# Patient Record
Sex: FEMALE | Race: WHITE | Employment: FULL TIME | URBAN - METROPOLITAN AREA
[De-identification: names, ages, dates, MRNs, and addresses within clinical notes are randomized per-mention and may not be internally consistent; named-entity substitution may affect disease eponyms.]

---

## 2022-09-07 ENCOUNTER — OFFICE VISIT (OUTPATIENT)
Dept: ORTHOPEDIC SURGERY | Age: 47
End: 2022-09-07
Payer: COMMERCIAL

## 2022-09-07 VITALS — BODY MASS INDEX: 43.19 KG/M2 | HEIGHT: 64 IN | WEIGHT: 253 LBS

## 2022-09-07 DIAGNOSIS — S99.921A INJURY OF RIGHT FOOT, INITIAL ENCOUNTER: ICD-10-CM

## 2022-09-07 DIAGNOSIS — S99.911A INJURY OF RIGHT ANKLE, INITIAL ENCOUNTER: Primary | ICD-10-CM

## 2022-09-07 PROCEDURE — L4360 PNEUMAT WALKING BOOT PRE CST: HCPCS | Performed by: ORTHOPAEDIC SURGERY

## 2022-09-07 PROCEDURE — 99204 OFFICE O/P NEW MOD 45 MIN: CPT | Performed by: ORTHOPAEDIC SURGERY

## 2022-09-07 PROCEDURE — A9999 DME SUPPLY OR ACCESSORY, NOS: HCPCS | Performed by: ORTHOPAEDIC SURGERY

## 2022-09-07 RX ORDER — ESOMEPRAZOLE MAGNESIUM 40 MG/1
CAPSULE, DELAYED RELEASE ORAL
COMMUNITY
Start: 2012-12-19

## 2022-09-07 RX ORDER — NYSTATIN 100000 [USP'U]/G
POWDER TOPICAL
COMMUNITY
Start: 2020-10-21

## 2022-09-07 RX ORDER — AMLODIPINE BESYLATE 5 MG/1
TABLET ORAL
COMMUNITY
Start: 2022-09-02

## 2022-09-07 RX ORDER — OMEPRAZOLE 40 MG/1
CAPSULE, DELAYED RELEASE ORAL
COMMUNITY
Start: 2022-06-22

## 2022-09-07 RX ORDER — TRAZODONE HYDROCHLORIDE 100 MG/1
TABLET ORAL
COMMUNITY

## 2022-09-07 RX ORDER — MAGNESIUM OXIDE 400 MG/1
TABLET ORAL
COMMUNITY
Start: 2021-07-26

## 2022-09-07 RX ORDER — ONDANSETRON 4 MG/1
TABLET, ORALLY DISINTEGRATING ORAL
COMMUNITY

## 2022-09-07 RX ORDER — TOPIRAMATE 100 MG/1
TABLET, FILM COATED ORAL
COMMUNITY
Start: 2022-08-15

## 2022-09-07 RX ORDER — LORATADINE 10 MG/1
TABLET ORAL
COMMUNITY
Start: 2022-09-02

## 2022-09-07 RX ORDER — IBUPROFEN 800 MG/1
800 TABLET ORAL
COMMUNITY

## 2022-09-07 RX ORDER — ONDANSETRON HYDROCHLORIDE 8 MG/1
TABLET, FILM COATED ORAL
COMMUNITY

## 2022-09-07 RX ORDER — MEDROXYPROGESTERONE ACETATE 10 MG/1
TABLET ORAL
COMMUNITY

## 2022-09-07 RX ORDER — DIAZEPAM 5 MG/1
TABLET ORAL
COMMUNITY
Start: 2022-08-08

## 2022-09-07 RX ORDER — CHLORPHENIRAMINE MALEATE 4 MG/1
TABLET ORAL
COMMUNITY
Start: 2021-08-10

## 2022-09-07 RX ORDER — AMOXICILLIN AND CLAVULANATE POTASSIUM 875; 125 MG/1; MG/1
1 TABLET, FILM COATED ORAL 2 TIMES DAILY
Qty: 28 TABLET | Refills: 0 | Status: SHIPPED | OUTPATIENT
Start: 2022-09-07 | End: 2022-09-21

## 2022-09-07 RX ORDER — ROPINIROLE 1 MG/1
TABLET, FILM COATED ORAL
COMMUNITY
Start: 2022-07-30

## 2022-09-07 RX ORDER — HYDROXYZINE 50 MG/1
TABLET, FILM COATED ORAL
COMMUNITY
Start: 2021-05-26

## 2022-09-07 RX ORDER — ERTUGLIFLOZIN 15 MG/1
TABLET, FILM COATED ORAL
COMMUNITY
Start: 2021-07-26

## 2022-09-07 RX ORDER — FLUOXETINE HYDROCHLORIDE 90 MG/1
CAPSULE, DELAYED RELEASE PELLETS ORAL
COMMUNITY
Start: 2012-12-19

## 2022-09-07 RX ORDER — CETIRIZINE HYDROCHLORIDE 10 MG/1
TABLET ORAL
COMMUNITY
Start: 2022-06-19

## 2022-09-07 RX ORDER — DEXAMETHASONE 6 MG/1
TABLET ORAL
COMMUNITY

## 2022-09-07 RX ORDER — QUETIAPINE FUMARATE 200 MG/1
TABLET, FILM COATED ORAL
COMMUNITY
Start: 2022-08-08

## 2022-09-07 RX ORDER — LEVOTHYROXINE SODIUM 0.2 MG/1
TABLET ORAL
COMMUNITY
Start: 2022-09-02

## 2022-09-07 RX ORDER — INSULIN ASPART 100 [IU]/ML
INJECTION, SOLUTION INTRAVENOUS; SUBCUTANEOUS
COMMUNITY

## 2022-09-07 RX ORDER — CYCLOBENZAPRINE HCL 5 MG
TABLET ORAL
COMMUNITY
Start: 2022-08-29

## 2022-09-07 RX ORDER — FLUTICASONE PROPIONATE 50 MCG
SPRAY, SUSPENSION (ML) NASAL
COMMUNITY
Start: 2022-08-08

## 2022-09-07 RX ORDER — PSEUDOEPHEDRINE HCL 30 MG
100 TABLET ORAL 2 TIMES DAILY
COMMUNITY
Start: 2021-02-19

## 2022-09-07 RX ORDER — OXYCODONE HYDROCHLORIDE 5 MG/1
TABLET ORAL
COMMUNITY

## 2022-09-07 RX ORDER — LEVOFLOXACIN 750 MG/1
TABLET ORAL
COMMUNITY
Start: 2022-06-22 | End: 2022-09-07 | Stop reason: ALTCHOICE

## 2022-09-07 RX ORDER — MONTELUKAST SODIUM 10 MG/1
TABLET ORAL
COMMUNITY
Start: 2022-08-10

## 2022-09-07 RX ORDER — MIRTAZAPINE 15 MG/1
TABLET, FILM COATED ORAL
COMMUNITY
Start: 2022-09-02

## 2022-09-07 RX ORDER — INSULIN LISPRO 100 U/ML
INJECTION, SOLUTION INTRAVENOUS; SUBCUTANEOUS
COMMUNITY
Start: 2022-08-25

## 2022-09-07 RX ORDER — AMOXICILLIN AND CLAVULANATE POTASSIUM 875; 125 MG/1; MG/1
TABLET, FILM COATED ORAL
COMMUNITY
Start: 2022-07-28 | End: 2022-09-07 | Stop reason: ALTCHOICE

## 2022-09-07 RX ORDER — BUTALBITAL, ACETAMINOPHEN AND CAFFEINE 50; 325; 40 MG/1; MG/1; MG/1
TABLET ORAL
COMMUNITY
Start: 2019-01-02

## 2022-09-07 RX ORDER — AZELASTINE HYDROCHLORIDE 137 UG/1
SPRAY, METERED NASAL
COMMUNITY
Start: 2022-06-22

## 2022-09-07 RX ORDER — CELECOXIB 200 MG/1
CAPSULE ORAL
COMMUNITY

## 2022-09-07 RX ORDER — DULOXETIN HYDROCHLORIDE 60 MG/1
CAPSULE, DELAYED RELEASE ORAL
COMMUNITY
Start: 2022-09-02

## 2022-09-07 RX ORDER — GABAPENTIN 800 MG/1
TABLET ORAL
COMMUNITY
Start: 2022-08-08

## 2022-09-07 RX ORDER — ALBUTEROL SULFATE 90 UG/1
AEROSOL, METERED RESPIRATORY (INHALATION)
COMMUNITY

## 2022-09-07 RX ORDER — HYDROXYZINE PAMOATE 100 MG/1
CAPSULE ORAL
COMMUNITY

## 2022-09-07 RX ORDER — METRONIDAZOLE 500 MG/1
TABLET ORAL
COMMUNITY
Start: 2022-08-29 | End: 2022-09-07 | Stop reason: ALTCHOICE

## 2022-09-07 RX ORDER — ATORVASTATIN CALCIUM 40 MG/1
TABLET, FILM COATED ORAL
COMMUNITY
Start: 2022-07-30

## 2022-09-07 RX ORDER — AZITHROMYCIN 250 MG/1
TABLET, FILM COATED ORAL
COMMUNITY
Start: 2022-07-19 | End: 2022-09-07 | Stop reason: ALTCHOICE

## 2022-09-07 RX ORDER — PROMETHAZINE HYDROCHLORIDE 25 MG/1
TABLET ORAL PRN
COMMUNITY

## 2022-09-07 RX ORDER — OMEGA-3S/DHA/EPA/FISH OIL/D3 300MG-1000
400 CAPSULE ORAL DAILY
COMMUNITY

## 2022-09-07 RX ORDER — MELOXICAM 15 MG/1
TABLET ORAL
COMMUNITY
Start: 2022-09-02

## 2022-09-07 RX ORDER — TRAMADOL HYDROCHLORIDE 50 MG/1
TABLET ORAL
COMMUNITY
Start: 2022-08-29

## 2022-09-07 NOTE — LETTER
DME Patient Authorization Form    Name: Louann Viera  : 1975  MRN: [id number]   Age: 55 y.o. Gender: female  Delivery Address: [facility]     Diagnosis:     ICD-10-CM    1. Injury of right ankle, initial encounter  S99.928V XR ANKLE RIGHT (MIN 3 VIEWS)     XR FOOT RIGHT (2 VIEWS)      2. Injury of right foot, initial encounter  S99.380W            Requested DME:  Bunion Splint - -99 ($15.00) X 1 - right  Walker Boot -  ($290.00) X 1 - right        Clinical Notes:     **Indicates non-covered items by insurance. Payment expected on date of service. Electronically signed by  Provider: Dennis Friend MD  _______________________________ Date: 2022                             [facility] [place] Tax ID # [id number]        Durable Medical Equipment and/or Orthotics Patient Consent     I understand that my physician has prescribed this medical supply as part of my treatment plan as a matter of Medical Necessity.  I understand that I have a choice in where I receive my prescribed orthopedic supplies and/or services.  I authorize [facility] to furnish this service/product and to provide my insurance carrier with any information requested in order to process for payment.  I instruct my insurance carrier to pay [facility] these services/products.  I understand that my insurance carrier may deny payment for this supply because it is a non-covered item, deemed not medically necessary or considered experimental.   I understand that any cost not covered by my insurance carrier will be solely my financial responsibility.  I have received the Supplier Standards and have reviewed them.    I have received the prescribed item and have been fully instructed on the proper use of the above services/products.    ______ (Patient Initials) I understand that all DME items are non-returnable after being dispensed. Items still in sealed packaging may be returned up to 14 days after purchasing. [address] will replace items that are defective.    ______ (Patient Initials) I understand that Ean Marcano will not file a claim with my insurance carrier for this service/product and I am waiving my right to file a claim on my own for this service/product with my insurance company as this item is NON-COVERED (Denoted by the **) by my Insurance company/policy. ______ (Patient Initials) I understand that I am responsible to bring my equipment to the hospital for any surgery. ______________________________________________  ________________________  Patient / [facility]            Thank you for considering [address] Your physician has prescribed specific medical equipment or devices for your home use. The following describes your rights and responsibilities as our customer. Right to Choose Providers: You have a choice regarding which company supplies your home medical equipment and devices, and to consult your physician in this decision. You may choose a medical supply store, a home medical equipment provider, or a specialist such as [facility]. [facility] will coordinate with your physician to provide the medical equipment or devices prescribed for your home use. Right to Service:  You have the right to considerate, respectful and nondiscriminatory care. You have the right to receive accurate and easily understood information about your health care. If you speak a foreign language, or don't understand the discussions, assistance will be provided to allow you to make informed health care decisions.   You have the right to know your treatment options and to participate in decisions about your care, including the right to accept or refuse treatment. You have the right to expect a reasonable response to your requests for treatment or service. You have the right to talk in confidence with health care providers and to have your health care information protected. You have the right to receive an explanation of your bill. You have the right to complain about the service or product you receive. Patient Responsibilities:  Please provide complete and accurate information about your health insurance benefits and make arrangements for the timely payment of your bill. [facility] will, if possible, assume responsibility for billing your insurance (Medicare, Medicaid and commercial) for the prescribed equipment or devices. If your policy does not cover the prescribed product, or only covers a portion of the bill, you are responsible for any remaining balance. Return and Exchange Policy:  [facility] will honor published  Warranties for products. [facility] will accept returns or exchanges within 14 days from the date of receipt, providin) the product must be in new condition; 2) receipt as required; and 3) used disposable and hygiene products may only be returned due to a defective product. Note: Refunds will be issued in a timely manner, please allow 4-6 weeks for processing. Complaint Procedures and DME Consumer Protection Resources:  [facility] values you as a customer, and is committed to resolving patient concerns. This commitment includes understanding and documenting your concerns, conducting a review of internal procedures, and providing you with an explanation and resolution to your concerns. Should you have any questions about our services or billing process, please contact our office at (practice phone number).   If we are unable to resolve the concern, you have the right to direct comments to the office of Consumer Protection, in the [address] or the [employer] office, without fear of repercussion. DMEPOS SUPPLIER STANDARDS    A supplier must be in compliance with all applicable Federal and [facility] and regulatory requirements. A supplier must provide complete and accurate information on the DMEPOS supplier application. Any changes to this information must be reported to the [facility] [employer] within 30 days. An authorized individual (one whose signature is binding) must sign the application for billing privileges. A supplier must fill orders from its own inventory, or must contract with other companies for the purchase of items necessary to fill the order. A supplier may not contract with any entity that is currently excluded from the Medicare program, any [facility] program, or from any other Federal procurement or Nonprocurement programs. A supplier must advise beneficiaries that they may rent or purchase inexpensive or routinely purchased durable medical equipment, and of the purchase option for capped rental equipment. A supplier must notify beneficiaries of warranty coverage and honor all warranties under applicable State Law, and repair or replace free of charge Medicare covered items that are under warranty. A supplier must maintain a physical facility on an appropriate site. A supplier must permit CMS, or its agents to conduct on-site inspections to ascertain the supplier's compliance with these standards. The supplier location must be accessible to beneficiaries during reasonable business hours, and must maintain a visible sign and posted hours of operation. A supplier must maintain a primary business telephone listed under the name of the business in a [employer] or a toll free number available through directory assistance. The exclusive use of a beeper, answering machine or cell phone is prohibited.   A supplier must have comprehensive liability insurance in the amount of at least $300,000 that covers both the supplier's place of business and all customers and employees of the supplier. If the supplier manufactures its own items, this insurance must also cover product liability and completed operations. A supplier must agree not to initiate telephone contact with beneficiaries, with a few exceptions allowed. This standard prohibits suppliers from calling beneficiaries in order to solicit new business. A supplier is responsible for delivery and must instruct beneficiaries on use of Medicare covered items, and maintain proof of delivery. A supplier must answer questions, and respond to complaints of the beneficiaries, and maintain documentation of such contacts. A supplier must maintain and replace at no charge or repair directly, or through a service contract with another company, Medicare covered items it has rented to beneficiaries. A supplier must accept returns of substandard (less than full quality for the particular item) or unsuitable items (inappropriate for the beneficiary at the time it was fitted and rented or sold) from beneficiaries. A supplier must disclose these supplier standards to each beneficiary to whom it supplies a Medicare-covered item. A supplier must disclose to the government any person having ownership, financial, or control interest in the supplier. A supplier must not convey or reassign a supplier number; i.e., the supplier may not sell or allow another entity to use its Medicare billing number. A supplier must have a complaint resolution protocol established to address beneficiary complaints that relate to these standards. A record of these complaints must be maintained at the physical facility. Complaint records must include: the name, address, telephone number and health insurance claim number of the beneficiary, a summary of the complaint, and any action taken to resolve it. A supplier must agree to furnish CMS any information required by the Medicare statute and implementing regulations.   A supplier of DMEPOS and other items and services must be accredited by a CMS-approved accreditation organization in order to receive and retain a supplier billing number. The accreditation must indicate the specific products and services, for which the supplier is accredited in order for the supplier to receive payment for those specific products and services. A DMEPOS supplier must notify their accreditation organization when a new DMEPOS location is opened. The accreditation organization may accredit the new supplier location for three months after it is operational without requiring a new site visit. All DMEPOS supplier locations, whether owned or subcontracted, must meet the [employer] and be separately accredited in order to bill Medicare. An accredited supplier may be denied enrollment or their enrollment may be revoked, if CMS determines that they are not in compliance with the DMEPOS quality standards. A DMEPOS supplier must disclose upon enrollment all products and services, including the addition of new product lines for which they are seeking accreditation. If a new product line is added after enrollment, the DMEPOS supplier will be responsible for notifying the accrediting body of the new product so that the DMEPOS supplier can be re-surveyed and accredited for these new products. Must meet the surety bond requirements specified in 42 C. F.R. 424.57(c). Implementation date- May 4, 2009. A supplier must obtain oxygen from a state-licensed oxygen supplier. A supplier must maintain ordering and referring documentation consistent with provisions found in 42 C. F.R. 424.516(f). DMEPOS suppliers are prohibited from sharing a practice location with certain other Medicare providers and suppliers. DMEPOS suppliers must remain open to the public for a minimum of 30 hours per week with certain exceptions.

## 2022-09-07 NOTE — LETTER
[address] [address]  Phone: [phone]  Fax: [phone]    Kianna Marshall MD        September 7, 2022     Patient: Wil Curtis   YOB: 1975   Date of Visit: 9/7/2022       To Whom It May Concern: It is my medical opinion that Wil Curtis:  Work status:   Out of work until 09/12/2022. Beginning 09/12/2022, she may return to sitting work. If no sitting work available, out of work for 3 weeks    If you have any questions or concerns, please don't hesitate to call.     Sincerely,        Kianna Marshall MD

## 2022-09-07 NOTE — LETTER
[address] [address]  Phone: [phone]  Fax: [phone]    Rosalynd Lanes, MD        September 7, 2022     Patient: Jhoana Faye   YOB: 1975   Date of Visit: 9/7/2022       To Whom It May Concern: It is my medical opinion that [employer] of work until 09/12/2022: Beginning 09/12/2022, she may return to sitting work. If no sitting longer available, out of work for 3 weeks     If you have any questions or concerns, please don't hesitate to call.     Sincerely,        Rosalynd Lanes, MD

## 2022-09-07 NOTE — PROGRESS NOTES
Name: Jennie Dates  YOB: 1975  Gender: female  MRN: [id number]    CC: Right ankle injury: Right foot injury    HPI:   08/28/2022: She reports falling at home injuring her ankle and foot  08/29/2022: [facility] evaluation  09/02/2022: [facility] evaluation: Postop shoe and crutches  09/07/2022: She presents to assess her foot     ROS/Meds/PSH/PMH/FH/SH: reviewed today    Tobacco:  reports that she has been smoking cigarettes. She has never used smokeless tobacco.   Care everywhere: Treatment for low back pain, L5 radiculopathy: Listed diagnoses of depression, borderline personality disorder, general anxiety disorder treated with Seroquel Cymbalta and Remeron    Physical Examination:  Patient appears to be alert and oriented with acceptable appearance.   No obvious distress or SOB  CV: appears to have acceptable vascular color and capillary refill  Neuro: appears to have mostly intact light touch sensation   Skin: Right ankle to toe bruising; mild dorsal foot redness  MS: No standing or gait exam  Right = medial and lateral ankle pain  Right = hindfoot and midfoot pain  Right = 1-5 forefoot to toe pain    XR: Right side: Standing AP lateral mortise ankle plus AP oblique foot taken today with os trigonum; anterior and posterior enthesopathy; 2-3 tarsometatarsal arthritis; narrowing calcaneonavicular region; possible dorsal talar avulsion fracture; great toe intra-articular IP proximal phalanx fracture; tibial avulsion 2-5 base proximal phalanx fracture; suspected fifth metatarsal tuberosity fracture; no gross ankle pathology appreciated except soft tissue swelling  XR Impression:  As above      Reviewed Test/Records/Documents:  07/19/2022: Pain management visit reflects low back pain; leg pain with diagnosis of L5 radiculopathy treated with lumbar selective nerve root block/transforaminal epidural steroid injection  09/02/2022: [facility] x-rays right foot radiologic impression: Acute 2-5 proximal phalanx fractures:

## 2022-09-08 NOTE — PROGRESS NOTES
The patient was prescribed a walker boot for the patient's right foot. The patient wears a size 10 shoe and I fitted them with a M size boot. The patient was fitted and instructed on the use of prescribed walker boot. I explained how to fit themselves and that the plastic flexible piece should always be on the front of the boot and secured by the Velcro straps on top. The air bladder in the boot was adjusted according to proper fit and comfort. The patient walked a short distance and acknowledged satisfaction with current fit. I also explained that they need a heel lift or a higher heeled shoe for the uninvolved LE to help normalize gait and avoid excessive low back stress/strain due to leg length inequality created from walker boot. The patient was also prescribed and fitted with a bunion splint for the right foot to wear in the boot. Patient read and signed documenting they understand and agree to [facility]'s current DME return policy.

## 2022-09-12 ENCOUNTER — TELEPHONE (OUTPATIENT)
Dept: ORTHOPEDIC SURGERY | Age: 47
End: 2022-09-12

## 2022-09-12 RX ORDER — AMOXICILLIN 500 MG/1
500 CAPSULE ORAL 3 TIMES DAILY
Qty: 30 CAPSULE | Refills: 1 | Status: SHIPPED | OUTPATIENT
Start: 2022-09-12 | End: 2022-09-26

## 2022-09-12 NOTE — TELEPHONE ENCOUNTER
She was seen by Dr. Dior Redman and her pain hasn't gotten any better. She was supposed to return to work today but they didn't schedule her until tomorrow. She also got very sick taking the Augmentin and is wondering if there is something else she can take.

## 2022-09-16 ENCOUNTER — OFFICE VISIT (OUTPATIENT)
Dept: ORTHOPEDIC SURGERY | Age: 47
End: 2022-09-16
Payer: COMMERCIAL

## 2022-09-16 DIAGNOSIS — M79.2 NEURALGIA OF RIGHT FOOT: ICD-10-CM

## 2022-09-16 DIAGNOSIS — S99.921D INJURY OF RIGHT FOOT, SUBSEQUENT ENCOUNTER: ICD-10-CM

## 2022-09-16 DIAGNOSIS — S99.911D INJURY OF RIGHT ANKLE, SUBSEQUENT ENCOUNTER: Primary | ICD-10-CM

## 2022-09-16 PROCEDURE — 99213 OFFICE O/P EST LOW 20 MIN: CPT | Performed by: ORTHOPAEDIC SURGERY

## 2022-09-16 RX ORDER — LIDOCAINE 5% 5 G/100G
CREAM TOPICAL
Qty: 45 G | Refills: 2 | Status: SHIPPED | OUTPATIENT
Start: 2022-09-16

## 2022-09-16 NOTE — LETTER
[address] [address]  Phone: [phone]  Fax: [phone]    Araseli Stoner MD        September 16, 2022     Patient: Dipak Rose   YOB: 1975   Date of Visit: 9/16/2022       To Whom It May Concern: It is my medical opinion that Dipak Rose:  Work Status: Out of work for 3 weeks. If you have any questions or concerns, please don't hesitate to call.      Sincerely,        Araseli Stoner MD

## 2022-09-16 NOTE — PROGRESS NOTES
Name: Mitzi Gonzalez  YOB: 1975  Gender: female  MRN: [id number]    09/07/2022: Initial visit with me for[de-identified] Right ankle injury: Right foot injury  09/12/2022: She called stating Augmentin was bothering her stomach and was switched to plain amoxicillin  09/16/2022: She presents with pain and sensitivity in the plantar lateral foot to medial foot with some pain radiating up above her ankle. She relates pain to trying to return to work     HPI:   08/28/2022: She reports falling at home injuring her ankle and foot  08/29/2022: [facility] evaluation  09/02/2022: [facility] evaluation: Postop shoe and crutches  09/07/2022: She presented to assess her foot     ROS/Meds/PSH/PMH/FH/SH: reviewed today    Tobacco:  reports that she has been smoking cigarettes. She has never used smokeless tobacco.   Care everywhere: Treatment for low back pain, L5 radiculopathy: Listed diagnoses of depression, borderline personality disorder, general anxiety disorder treated with Seroquel Cymbalta and Remeron    Physical Examination:  Patient appears to be alert and oriented with acceptable appearance.   No obvious distress or SOB  CV: appears to have acceptable vascular color and capillary refill  Neuro: appears to have mostly intact light touch sensation   Skin: Right = much less ankle to foot swelling; resolved redness; no webspace ulcer; no lesions  MS: Standing: Plantigrade: Gait postop shoe limited  Right = majority of the pain is sensitivity in the lateral to plantar lateral forefoot extending to the medial foot  Right = medial and lateral ankle pain  Right = hindfoot and midfoot pain  Right = 1-5 forefoot to toe pain    XR: Right side: Standing AP lateral mortise ankle plus AP oblique foot taken today with os trigonum; anterior and posterior enthesopathy; 2-3 tarsometatarsal arthritis; narrowing calcaneonavicular region; possible dorsal talar avulsion fracture; great toe intra-articular IP proximal phalanx fracture; tibial avulsion 2-5 base proximal phalanx fractures; no definite fifth metatarsal tuberosity fracture; no gross ankle pathology appreciated except soft tissue swelling  XR Impression:  As above      Reviewed Test/Records/Documents:  07/19/2022: Pain management visit reflected low back pain; leg pain with diagnosis of L5 radiculopathy treated with lumbar selective nerve root block/transforaminal epidural steroid injection  09/02/2022: [facility] x-rays right foot radiologic impression: Acute 2-5 proximal phalanx fractures: Fifth metatarsal fracture: Hallux valgus with first MTP and midfoot degenerative changes     Injection: No indication    Assessment:    Right posttraumatic foot neuralgia  Right great toe IP joint proximal phalanx fracture  Right 2-5 tibial base proximal phalangeal fractures  Right suspected fifth metatarsal tuberosity fracture  Right ankle and hindfoot sprains     Plan:   The patient and I discussed the above assessment. We explored treatment options. Her swelling, redness and overall foot appearance look better  Unfortunately, she has significant sensitivity in her foot  Plan for topical medication and keeping her out of work so her pain can level can improve  She reports also being under treatment for injuring her tailbone. On Ultram and Flexeril  I believe she can recover without the need for surgery  Advanced medical imaging: Right ankle MRI scan: Right foot MRI scan: Potential in the future    DME: 3D boot: Bunion splint  We discussed ankle/foot care and boot protection  PT: No indication for formal PT     Medication - OTC meds prn:   Prescribed: Topical 5% Neurontin and Xylocaine to apply to painful foot  She will complete prior prescribed Amoxicillin  We discussed that no Voltaren gel as she lists Diclofenac as an allergy.  She reports taking it from the ER with no ill effects     Surgical discussion: No indication for surgery today  Follow up: 3-4 weeks x-rays ankle and foot standing  Work status: Out of work x 3 weeks    This note was created using Dragon voice recognition software which may result in errors of speech and spelling recognition and word/phrase syntax errors.

## 2022-09-28 ENCOUNTER — TELEPHONE (OUTPATIENT)
Dept: ORTHOPEDIC SURGERY | Age: 47
End: 2022-09-28

## 2022-09-28 NOTE — TELEPHONE ENCOUNTER
She is having a lot of pain coming from her big toe where it's fractured and also her pinky toe radiating to the outer side of the foot and up into her ankle. Please call to discuss. The voltaren gel is not helping.

## 2022-09-29 ENCOUNTER — OFFICE VISIT (OUTPATIENT)
Dept: ORTHOPEDIC SURGERY | Age: 47
End: 2022-09-29
Payer: COMMERCIAL

## 2022-09-29 ENCOUNTER — TELEPHONE (OUTPATIENT)
Dept: ORTHOPEDIC SURGERY | Age: 47
End: 2022-09-29

## 2022-09-29 ENCOUNTER — HOSPITAL ENCOUNTER (OUTPATIENT)
Dept: ULTRASOUND IMAGING | Age: 47
Discharge: HOME | End: 2022-10-02
Payer: COMMERCIAL

## 2022-09-29 DIAGNOSIS — S99.911D INJURY OF RIGHT ANKLE, SUBSEQUENT ENCOUNTER: Primary | ICD-10-CM

## 2022-09-29 DIAGNOSIS — M79.2 NEURALGIA OF RIGHT FOOT: ICD-10-CM

## 2022-09-29 DIAGNOSIS — S99.911D INJURY OF RIGHT ANKLE, SUBSEQUENT ENCOUNTER: ICD-10-CM

## 2022-09-29 DIAGNOSIS — S99.921D INJURY OF RIGHT FOOT, SUBSEQUENT ENCOUNTER: ICD-10-CM

## 2022-09-29 PROCEDURE — 93971 EXTREMITY STUDY: CPT

## 2022-09-29 PROCEDURE — 99214 OFFICE O/P EST MOD 30 MIN: CPT | Performed by: ORTHOPAEDIC SURGERY

## 2022-09-29 NOTE — LETTER
[address] [facility]  [address] [place] [address],[facility]  Phone: [phone]  Fax: [phone]    Aleshia Sher MD        September 29, 2022     Patient: Mariana Baugh   YOB: 1975   Date of Visit: 9/29/2022       To Whom It May Concern: It is my medical opinion that Mariana Baugh should remain out of work for 4 weeks. If you have any questions or concerns, please don't hesitate to call.     Sincerely,        Aleshia Sher MD

## 2022-09-29 NOTE — PROGRESS NOTES
suspected first TMT instability; narrowing calcaneonavicular region; possible dorsal talar avulsion fracture; great toe intra-articular IP proximal phalanx fracture; tibial avulsion 2-5 base proximal phalanx fractures; no fifth metatarsal tuberosity fracture; no gross ankle pathology appreciated except soft tissue swelling  XR Impression:  As above      Reviewed Test/Records/Documents:  07/19/2022: Pain management visit reflected low back pain; leg pain with diagnosis of L5 radiculopathy treated with lumbar selective nerve root block/transforaminal epidural steroid injection  09/02/2022: [facility] x-rays right foot radiologic impression: Acute 2-5 proximal phalanx fractures: Fifth metatarsal fracture: Hallux valgus with first MTP and midfoot degenerative changes     09/29/2022: Right lower extremity duplex ultrasound radiologic impression:  1. Negative for DVT  2. Possible lipomas in subcutaneous tissues  3. Under findings section radiologist reflects:  Fluid collection in the popliteal fossa likely a Baker's cyst  Ovoid slightly hyperechoic masses within the subcutaneous tissues which are well-circumscribed. .. Assessment:    Right posttraumatic foot neuralgia  Right great toe IP joint proximal phalanx fracture  Right 2-5 tibial base proximal phalangeal fractures  Right suspected fifth metatarsal tuberosity fracture  Right ankle and hindfoot sprains     Plan:   The patient and I discussed the above assessment. We explored treatment options. She has no pitting edema and actually has very minimal ankle to foot swelling  She has concerns about whether she could have a DVT so duplex ultrasound ordered [see above reference report]  Her pain seems to be mainly sensitivity with pain radiating from her forefoot up into her ankle  She would like MRI scans of both ankle and foot  She is under pain management and uses Neurontin, Cymbalta and now Toradol.   In the past, she reported injuring her tailbone treated with Ultram

## 2022-10-03 ENCOUNTER — TELEPHONE (OUTPATIENT)
Dept: ORTHOPEDIC SURGERY | Age: 47
End: 2022-10-03

## 2022-10-03 NOTE — TELEPHONE ENCOUNTER
She has gotten an eviction notice and has to go to court. She states Dr. Tahmina Vasquez said he would write her a letter if she every needed it. She needs it to say that he isn't sure when her foot would be better and not sure when she can return to work and that he may be sending her to one of his coworkers to be evaluated for surgery. Please call to discuss.

## 2022-10-10 ENCOUNTER — CLINICAL DOCUMENTATION (OUTPATIENT)
Dept: ORTHOPEDIC SURGERY | Age: 47
End: 2022-10-10

## 2022-10-10 NOTE — PROGRESS NOTES
MET did a peer to peer to get MRIs approved. Both MRIs are approved from 10-10-22 through 11-05-22. Ankle MRI approval code: [id number]. Foot MRI approval code: [id number].

## 2022-11-02 ENCOUNTER — OFFICE VISIT (OUTPATIENT)
Dept: ORTHOPEDIC SURGERY | Age: 47
End: 2022-11-02
Payer: COMMERCIAL

## 2022-11-02 DIAGNOSIS — S99.911D INJURY OF RIGHT ANKLE, SUBSEQUENT ENCOUNTER: ICD-10-CM

## 2022-11-02 DIAGNOSIS — S99.921D INJURY OF RIGHT FOOT, SUBSEQUENT ENCOUNTER: Primary | ICD-10-CM

## 2022-11-02 DIAGNOSIS — M79.2 NEURALGIA OF RIGHT FOOT: ICD-10-CM

## 2022-11-02 PROCEDURE — 99213 OFFICE O/P EST LOW 20 MIN: CPT | Performed by: ORTHOPAEDIC SURGERY

## 2022-11-02 NOTE — LETTER
[facility]  [address] [place] [id number]  Phone: [phone]  Fax: [phone]    Ray Blanco MD        November 2, 2022     Patient: Krishna Werner   YOB: 1975   Date of Visit: 11/2/2022       To Whom It May Concern: It is my medical opinion that Krishna Werner Work status: Out of work x 4 weeks    If you have any questions or concerns, please don't hesitate to call.     Sincerely,        Ray Blanco MD

## 2022-11-02 NOTE — PROGRESS NOTES
Name: Brittany Hamlin  YOB: 1975  Gender: female  MRN: [id number]    09/07/2022: Initial visit with me for: Right ankle injury: Right foot injury  09/12/2022: She called stating Augmentin was bothering her stomach and was switched to plain Amoxicillin  09/16/2022: She presented with pain and sensitivity in the plantar lateral foot to medial foot with some pain radiating up above her ankle. She felt her pain related to going back to work  09/29/2022: She presented with concerns about whether she could potentially have a blood clot due to her pain and also would like MRI scans due to her pain. Pain management added Toradol. 11/02/2022: She presents to discuss her MRI scans and duplex ultrasound. She reports that after her ultrasound revealed Veliz's cyst, she was treated by an orthopedist in [place] with a knee injection. She reports that since that knee injection she has chronic pain in the anterior lateral knee extending distally into her anterior shin. She saw her pain management doctor - back injection did not resolve her anterior lateral knee pain. HPI:   08/28/2022: She reports falling at home injuring her ankle and foot  08/29/2022: AFC evaluation  09/02/2022: [facility] evaluation: Postop shoe and crutches  09/07/2022: She presented to assess her foot     ROS/Meds/PSH/PMH/FH/SH: reviewed today    Tobacco:  reports that she has been smoking cigarettes. She has never used smokeless tobacco.   Care everywhere: Treatment for low back pain, L5 radiculopathy: Listed diagnoses of depression, borderline personality disorder, general anxiety disorder treated with Seroquel Cymbalta and Remeron    Physical Examination:  Patient appears to be alert and oriented with acceptable appearance.   No obvious distress or SOB  CV: appears to have acceptable vascular color and capillary refill  Neuro: appears to have mostly intact light touch sensation   Skin: No gross swelling   MS: Standing: Plantigrade: Gait in postop shoe  Right = lateral ankle/hindfoot pain/sensitivity; no medial pain   Right = significant dorsal forefoot sensitivity   Right = has ankle and foot motion but protects strength testing      XR: Right side: Standing AP lateral mortise ankle plus AP oblique foot taken today with os trigonum; anterior and posterior enthesopathy; 2-3 tarsometatarsal arthritis; suspected first TMT instability; narrowing calcaneonavicular region; possible dorsal talar avulsion fracture; great toe intra-articular IP proximal phalanx fracture; tibial avulsion 2-5 base proximal phalanx fractures; no fifth metatarsal tuberosity fracture; no gross ankle pathology appreciated except soft tissue swelling  XR Impression:  As above      Reviewed Test/Records/Documents:  07/19/2022: Pain management visit reflected low back pain; leg pain with diagnosis of L5 radiculopathy treated with lumbar selective nerve root block/transforaminal epidural steroid injection  09/02/2022: [facility] x-rays right foot radiologic impression: Acute 2-5 proximal phalanx fractures: Fifth metatarsal fracture: Hallux valgus with first MTP and midfoot degenerative changes     09/29/2022: Right lower extremity duplex ultrasound radiologic impression:  1. Negative for DVT  2. Possible lipomas in subcutaneous tissues  3. Under findings section radiologist reflects:  Fluid collection in the popliteal fossa likely a Baker's cyst  Ovoid slightly hyperechoic masses within the subcutaneous tissues which are well-circumscribed. ..    10/28/2022: Right foot MRI scan without contrast: Radiologic impression:  1. Nondisplaced likely intra-articular fracture of the distal aspect of the fifth toe proximal phalanx. 2.  Additional suspected nondisplaced fracture of the distal aspect of the fourth toe proximal phalanx  3. Cortical avulsion fractures of the medial second through fifth proximal phalanx bases at the site of the medial collateral ligament attachments  4.   Marrow edema within the fifth metatarsal head, likely osseous contusion  5. Advanced degenerative changes of the second TMT joint and to a lesser degree the third TMT joint  6. Mild diffuse intramuscular edema of the intrinsic forefoot musculature    10/28/2022: Right ankle MRI scan without contrast: Radiologic impression:  1. No acute fracture. Well-corticated osseous body adjacent to the anterior process of the calcaneus, possible sequela of remote injury  2. Posterior subtalar joint chondrosis with subchondral cystic change. Degenerative changes at the second and third TMT joints  3. 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 Prominent os trigonum with associated small posterior ankle/hindfoot joint effusion and mild flexor houses longus tenosynovitis  5. Thickening of the central plantar fascial band with associated calcaneal enthesophyte, likely reflecting the plantar fasciopathy  6. Mild peroneal and posterior tibial tenosynovitis  7. Achilles insertional calcaneal views of the right with trace retrocalcaneal bursal fluid. 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 We explored treatment options. I discussed MRI scans in detail. She informed me about her concerns related to her knee, and I recommend she return to her orthopedist in [place]. She understands that is out of my field of expertise and I cannot opine on her knee  She also will continue with her pain management doctor  She understands continued ankle and foot protection. She prefers to use a postop shoe.     PT: Hopefully can begin PT on return    Medication - OTC meds prn:   Discussed prior prescribed: Topical 5% Neurontin and Xylocaine to apply to painful foot  She is under pain management  Despite listing Diclofenac as an allergy, she reported taking it from the ER with no ill effects     Surgical discussion: No indication today for lesser toe pinning. Future considerations for hindfoot and TMT fusion  Follow up: 4 weeks: X-rays ankle and foot  Work status: Out of work x 4 weeks    History and discussion of management with female     This note was created using Dragon voice recognition software which may result in errors of speech and spelling recognition and word/phrase syntax errors.

## 2022-12-06 ENCOUNTER — OFFICE VISIT (OUTPATIENT)
Dept: ORTHOPEDIC SURGERY | Age: 47
End: 2022-12-06

## 2022-12-06 DIAGNOSIS — M79.2 NEURALGIA OF RIGHT FOOT: ICD-10-CM

## 2022-12-06 DIAGNOSIS — S99.921D INJURY OF RIGHT FOOT, SUBSEQUENT ENCOUNTER: Primary | ICD-10-CM

## 2022-12-06 DIAGNOSIS — S99.911D INJURY OF RIGHT ANKLE, SUBSEQUENT ENCOUNTER: ICD-10-CM

## 2022-12-06 RX ORDER — QUETIAPINE FUMARATE 200 MG/1
TABLET, FILM COATED ORAL
COMMUNITY
Start: 2021-05-26

## 2022-12-06 RX ORDER — INSULIN LISPRO 100 [IU]/ML
INJECTION, SOLUTION INTRAVENOUS; SUBCUTANEOUS
COMMUNITY
Start: 2022-03-04

## 2022-12-06 RX ORDER — BACLOFEN 10 MG/1
TABLET ORAL
COMMUNITY
Start: 2022-11-03

## 2022-12-06 RX ORDER — MONTELUKAST SODIUM 10 MG/1
TABLET ORAL
COMMUNITY
Start: 2021-07-29

## 2022-12-06 RX ORDER — NALOXONE HYDROCHLORIDE 4 MG/.1ML
SPRAY NASAL
COMMUNITY
Start: 2022-11-03

## 2022-12-06 RX ORDER — MIRTAZAPINE 30 MG/1
TABLET, FILM COATED ORAL
COMMUNITY
Start: 2022-11-05

## 2022-12-06 RX ORDER — PREDNISONE 10 MG/1
TABLET ORAL
COMMUNITY
Start: 2022-10-13

## 2022-12-06 RX ORDER — ALBUTEROL SULFATE 90 UG/1
2 AEROSOL, METERED RESPIRATORY (INHALATION) EVERY 6 HOURS PRN
COMMUNITY
Start: 2021-05-25

## 2022-12-06 RX ORDER — ROPINIROLE 1 MG/1
TABLET, FILM COATED ORAL
COMMUNITY
Start: 2021-08-19

## 2022-12-06 RX ORDER — METRONIDAZOLE 500 MG/1
TABLET ORAL
COMMUNITY
Start: 2022-10-20

## 2022-12-06 RX ORDER — BLOOD SUGAR DIAGNOSTIC
STRIP MISCELLANEOUS
COMMUNITY
Start: 2022-11-16

## 2022-12-06 RX ORDER — HYDROCODONE BITARTRATE AND ACETAMINOPHEN 7.5; 325 MG/1; MG/1
TABLET ORAL
COMMUNITY
Start: 2022-11-28

## 2022-12-06 RX ORDER — OMEPRAZOLE 40 MG/1
CAPSULE, DELAYED RELEASE ORAL
COMMUNITY
Start: 2021-09-23

## 2022-12-06 RX ORDER — IMIQUIMOD 12.5 MG/.25G
CREAM TOPICAL
COMMUNITY
Start: 2022-11-17

## 2022-12-06 RX ORDER — OSELTAMIVIR PHOSPHATE 75 MG/1
75 CAPSULE ORAL DAILY
COMMUNITY
Start: 2022-12-02

## 2022-12-06 RX ORDER — AZITHROMYCIN 250 MG/1
TABLET, FILM COATED ORAL
COMMUNITY
Start: 2022-11-28

## 2022-12-06 NOTE — PROGRESS NOTES
Patient was fitted and instructed on a Carbon Fiber Insert for the right foot. Patient read and signed documenting they understand and agree to [facility]'s current DME return policy.

## 2022-12-06 NOTE — PROGRESS NOTES
Name: Gris Haynes  YOB: 1975  Gender: female  MRN: [id number]    09/07/2022: Initial visit with me for: Right ankle injury: Right foot injury  09/12/2022: She called stating Augmentin was bothering her stomach and was switched to plain Amoxicillin  09/16/2022: She presented with pain and sensitivity in the plantar lateral foot to medial foot with some pain radiating up above her ankle. She felt her pain related to going back to work  09/29/2022: She presented with concerns about whether she could potentially have a blood clot due to her pain and also would like MRI scans due to her pain. Pain management added Toradol. 11/02/2022: She presented to discuss her MRI scans and duplex ultrasound. She reported that after her ultrasound revealed Veliz's cyst, she was treated by an orthopedist in [place] with a knee injection. She reported that since that knee injection. ...anterior lateral knee pain extending distally into her anterior shin. She saw her pain management doctor - back injection did not resolve her anterior lateral knee pain. 12/06/2022: She continues to have ankle to foot pain as well as knee to anterior shin pain. She has an appointment with her knee doctor Friday    HPI:   08/28/2022: She reports falling at home injuring her ankle and foot  08/29/2022: [facility] evaluation  09/02/2022: [facility] evaluation: Postop shoe and crutches  09/07/2022: She presented to assess her foot     ROS/Meds/PSH/PMH/FH/SH: reviewed today    Tobacco:  reports that she has been smoking cigarettes. She has never used smokeless tobacco.   Care everywhere: Treatment for low back pain, L5 radiculopathy: Listed diagnoses of depression, borderline personality disorder, general anxiety disorder treated with Seroquel Cymbalta and Remeron    Physical Examination:  Patient appears to be alert and oriented with acceptable appearance.   No obvious distress or SOB  CV: appears to have acceptable vascular color and capillary refill  Neuro: appears to have mostly intact light touch sensation   Skin: No swelling   MS: Standing: Plantigrade: Gait in postop shoe  Right = fairly global lateral ankle to dorsal lateral foot pain/sensitivity   Right = full ankle/foot motion; 5/5 strength       XR: Right side: Standing AP lateral mortise ankle plus AP oblique foot taken today with os trigonum; anterior and posterior enthesopathy; 2-3 tarsometatarsal arthritis; suspected first TMT instability; narrowing calcaneonavicular region; possible dorsal talar avulsion fracture; great toe intra-articular IP proximal phalanx fracture; tibial avulsion 2-5 base proximal phalanx fractures   XR Impression:  As above      Reviewed Test/Records/Documents:  07/19/2022: Pain management visit reflected low back pain; leg pain with diagnosis of L5 radiculopathy treated with lumbar selective nerve root block/transforaminal epidural steroid injection  09/02/2022: [facility] x-rays right foot radiologic impression: Acute 2-5 proximal phalanx fractures: Fifth metatarsal fracture: Hallux valgus with first MTP and midfoot degenerative changes     09/29/2022: Right lower extremity duplex ultrasound radiologic impression:  1. Negative for DVT  2. Possible lipomas in subcutaneous tissues  3. Under findings section radiologist reflects:  Fluid collection in the popliteal fossa likely a Baker's cyst  Ovoid slightly hyperechoic masses within the subcutaneous tissues which are well-circumscribed. ..    10/28/2022: Right foot MRI scan without contrast: Radiologic impression:  1. Nondisplaced likely intra-articular fracture of the distal aspect of the fifth toe proximal phalanx. 2.  Additional suspected nondisplaced fracture of the distal aspect of the fourth toe proximal phalanx  3. Cortical avulsion fractures of the medial second through fifth proximal phalanx bases at the site of the medial collateral ligament attachments  4.   Marrow edema within the fifth metatarsal head, likely osseous contusion  5. Advanced degenerative changes of the second TMT joint and to a lesser degree the third TMT joint  6. Mild diffuse intramuscular edema of the intrinsic forefoot musculature    10/28/2022: Right ankle MRI scan without contrast: Radiologic impression:  1. No acute fracture. Well-corticated osseous body adjacent to the anterior process of the calcaneus, possible sequela of remote injury  2. Posterior subtalar joint chondrosis with subchondral cystic change. Degenerative changes at the second and third TMT joints  3. 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 Prominent os trigonum with associated small posterior ankle/hindfoot joint effusion and mild flexor houses longus tenosynovitis  5. Thickening of the central plantar fascial band with associated calcaneal enthesophyte, likely reflecting the plantar fasciopathy  6. Mild peroneal and posterior tibial tenosynovitis  7. Achilles insertional calcaneal views of the right with trace retrocalcaneal bursal fluid. 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 We explored treatment options. Regarding her knee, she has an appointment on Friday.   Regarding her chronic pain management, she will continue with her pain management doctor  Regarding her ankle/foot pain, she is healed to the point to where she can safely begin physical therapy  I outlined the x-rays with her today and I see no indication for forefoot midfoot or ankle surgery    PT: [facility]: Please consider, per the therapist expertise, modalities such as iontophoresis, phonophoresis and dry needling    Placed in a carbon fiber to use in her regular tennis shoes, so she can wean her postop shoe  Medication - OTC meds prn:   Discussed prior prescribed: Topical 5% Neurontin and Xylocaine to apply to painful foot  Medications per pain management  Despite listing Diclofenac as an allergy, she reported taking it from the ER with no ill effects     Surgical discussion: No indication today for lesser toe pinning. Future considerations for hindfoot and TMT fusion  Follow up: 6 weeks: X-rays ankle and foot  Work status: Out of work x 4 weeks     This note was created using Dragon voice recognition software which may result in errors of speech and spelling recognition and word/phrase syntax errors.

## 2022-12-06 NOTE — LETTER
[address],[facility] [address]  Phone: [phone]  Fax: [phone]    Lanning Holter, MD        December 6, 2022     Patient: Ephraim Lee   YOB: 1975   Date of Visit: 12/6/2022       To Whom It May Concern: It is my medical opinion that Ephraim Lee Work status: Out of work x 4 weeks  If you have any questions or concerns, please don't hesitate to call.     Sincerely,        Lanning Holter, MD

## 2022-12-06 NOTE — LETTER
DME Patient Authorization Form    Name: Constance Plascencia  : 1975  MRN: [id number]   Age: 52 y.o. Gender: female  Delivery Address: [facility]     Diagnosis:     ICD-10-CM    1. Injury of right foot, subsequent encounter  S99.921D XR ANKLE RIGHT (MIN 3 VIEWS)     XR FOOT RIGHT (2 VIEWS)     Amb External Referral To Physical Therapy     [employer] Insert ()      2. Neuralgia of right foot  M79.2 XR ANKLE RIGHT (MIN 3 VIEWS)     XR FOOT RIGHT (2 VIEWS)     Amb External Referral To Physical Therapy     Carbon Fiber Insert ()      3. Injury of right ankle, subsequent encounter  S99.911D XR ANKLE RIGHT (MIN 3 VIEWS)     XR FOOT RIGHT (2 VIEWS)     Amb External Referral To Physical Therapy     Carbon Fiber Insert ()           Requested DME:  Car[employer] Insert -  ($30) X 1 - right        Clinical Notes:     **Indicates non-covered items by insurance. Payment expected on date of service. Electronically signed by  Provider: Stoney Beavers MD__Date: 2022                            [facility]/[address] Tax ID # [id number]        Durable Medical Equipment and/or Orthotics Patient Consent     I understand that my physician has prescribed this medical supply as part of my treatment plan as a matter of Medical Necessity.  I understand that I have a choice in where I receive my prescribed orthopedic supplies and/or services.  I authorize [facility] to furnish this service/product and to provide my insurance carrier with any information requested in order to process for payment.  I instruct my insurance carrier to pay [facility] directly for these services/products.    I understand that my insurance carrier may deny payment for this supply because it is a non-covered item, deemed not medically necessary or considered experimental.   I understand that any cost not covered by my insurance carrier will be solely my financial responsibility.  I have received the Supplier Standards and have reviewed them.  I have received the prescribed item and have been fully instructed on the proper use of the above services/products.    ______ (Patient Initials) I understand that all DME items are non-returnable after being dispensed. Items still in sealed packaging may be returned up to 14 days after purchasing. [address] will replace items that are defective.    ______ (Patient Initials) I understand that [facility] will not file a claim with my insurance carrier for this service/product and I am waiving my right to file a claim on my own for this service/product with my insurance company as this item is NON-COVERED (Denoted by the **) by my Insurance company/policy. ______ (Patient Initials) I understand that I am responsible to bring my equipment to the hospital for any surgery. ______________________________________________  ________________________  Patient / [place]            Thank you for considering [address] Your physician has prescribed specific medical equipment or devices for your home use. The following describes your rights and responsibilities as our customer. Right to Choose Providers: You have a choice regarding which company supplies your home medical equipment and devices, and to consult your physician in this decision. You may choose a medical supply store, a home medical equipment provider, or a specialist such as [facility]. [facility] will coordinate with your physician to provide the medical equipment or devices prescribed for your home use. Right to Service:  You have the right to considerate, respectful and nondiscriminatory care.   You have the right to receive accurate and easily understood information about your health care. If you speak a foreign language, or don't understand the discussions, assistance will be provided to allow you to make informed health care decisions. You have the right to know your treatment options and to participate in decisions about your care, including the right to accept or refuse treatment. You have the right to expect a reasonable response to your requests for treatment or service. You have the right to talk in confidence with health care providers and to have your health care information protected. You have the right to receive an explanation of your bill. You have the right to complain about the service or product you receive. Patient Responsibilities:  Please provide complete and accurate information about your health insurance benefits and make arrangements for the timely payment of your bill. [facility] will, if possible, assume responsibility for billing your insurance (Medicare, Medicaid and commercial) for the prescribed equipment or devices. If your policy does not cover the prescribed product, or only covers a portion of the bill, you are responsible for any remaining balance. Return and Exchange Policy:  [facility] will honor published  Warranties for products. [facility] will accept returns or exchanges within 14 days from the date of receipt, providin) the product must be in new condition; 2) receipt as required; and 3) used disposable and hygiene products may only be returned due to a defective product. Note: Refunds will be issued in a timely manner, please allow 4-6 weeks for processing. Complaint Procedures and DME Consumer Protection Resources:  [facility] values you as a customer, and is committed to resolving patient concerns. This commitment includes understanding and documenting your concerns, conducting a review of internal procedures, and providing you with an explanation and resolution to your concerns.   Should you have any questions about our services or billing process, please contact our office at (practice phone number). If we are unable to resolve the concern, you have the right to direct comments to the office of Consumer Protection, in the [address] or the [facility] office, without fear of repercussion. DMEPOS SUPPLIER STANDARDS    A supplier must be in compliance with all applicable Federal and [facility] and regulatory requirements. A supplier must provide complete and accurate information on the DMEPOS supplier application. Any changes to this information must be reported to the [facility] within 30 days. An authorized individual (one whose signature is binding) must sign the application for billing privileges. A supplier must fill orders from its own inventory, or must contract with other companies for the purchase of items necessary to fill the order. A supplier may not contract with any entity that is currently excluded from the Medicare program, any [facility] program, or from any other Federal procurement or Nonprocurement programs. A supplier must advise beneficiaries that they may rent or purchase inexpensive or routinely purchased durable medical equipment, and of the purchase option for capped rental equipment. A supplier must notify beneficiaries of warranty coverage and honor all warranties under applicable State Law, and repair or replace free of charge Medicare covered items that are under warranty. A supplier must maintain a physical facility on an appropriate site. A supplier must permit CMS, or its agents to conduct on-site inspections to ascertain the supplier's compliance with these standards. The supplier location must be accessible to beneficiaries during reasonable business hours, and must maintain a visible sign and posted hours of operation.   A supplier must maintain a primary business telephone listed under the name of the business in a [employer] or a toll free number available through directory assistance. The exclusive use of a beeper, answering machine or cell phone is prohibited. A supplier must have comprehensive liability insurance in the amount of at least $300,000 that covers both the supplier's place of business and all customers and employees of the supplier. If the supplier manufactures its own items, this insurance must also cover product liability and completed operations. A supplier must agree not to initiate telephone contact with beneficiaries, with a few exceptions allowed. This standard prohibits suppliers from calling beneficiaries in order to solicit new business. A supplier is responsible for delivery and must instruct beneficiaries on use of Medicare covered items, and maintain proof of delivery. A supplier must answer questions, and respond to complaints of the beneficiaries, and maintain documentation of such contacts. A supplier must maintain and replace at no charge or repair directly, or through a service contract with another company, Medicare covered items it has rented to beneficiaries. A supplier must accept returns of substandard (less than full quality for the particular item) or unsuitable items (inappropriate for the beneficiary at the time it was fitted and rented or sold) from beneficiaries. A supplier must disclose these supplier standards to each beneficiary to whom it supplies a Medicare-covered item. A supplier must disclose to the government any person having ownership, financial, or control interest in the supplier. A supplier must not convey or reassign a supplier number; i.e., the supplier may not sell or allow another entity to use its Medicare billing number. A supplier must have a complaint resolution protocol established to address beneficiary complaints that relate to these standards.   A record of these complaints must be maintained at the physical facility. Complaint records must include: the name, address, telephone number and health insurance claim number of the beneficiary, a summary of the complaint, and any action taken to resolve it. A supplier must agree to furnish CMS any information required by the Medicare statute and implementing regulations. A supplier of DMEPOS and other items and services must be accredited by a CMS-approved accreditation organization in order to receive and retain a supplier billing number. The accreditation must indicate the specific products and services, for which the supplier is accredited in order for the supplier to receive payment for those specific products and services. A DMEPOS supplier must notify their accreditation organization when a new DMEPOS location is opened. The accreditation organization may accredit the new supplier location for three months after it is operational without requiring a new site visit. All DMEPOS supplier locations, whether owned or subcontracted, must meet the [employer] and be separately accredited in order to bill Medicare. An accredited supplier may be denied enrollment or their enrollment may be revoked, if CMS determines that they are not in compliance with the DMEPOS quality standards. A DMEPOS supplier must disclose upon enrollment all products and services, including the addition of new product lines for which they are seeking accreditation. If a new product line is added after enrollment, the DMEPOS supplier will be responsible for notifying the accrediting body of the new product so that the DMEPOS supplier can be re-surveyed and accredited for these new products. Must meet the surety bond requirements specified in 42 C. F.R. 424.57(c). Implementation date- May 4, 2009. A supplier must obtain oxygen from a state-licensed oxygen supplier. A supplier must maintain ordering and referring documentation consistent with provisions found in 42 C. F.R. 424.516(f). DMEPOS suppliers are prohibited from sharing a practice location with certain other Medicare providers and suppliers. DMEPOS suppliers must remain open to the public for a minimum of 30 hours per week with certain exceptions.

## 2022-12-09 ENCOUNTER — TELEPHONE (OUTPATIENT)
Dept: ORTHOPEDIC SURGERY | Age: 47
End: 2022-12-09

## 2022-12-09 NOTE — TELEPHONE ENCOUNTER
Called and spoke with pt. Pt would like to move her appt up with Dr. Kayleigh Pablo. Pt was rescheduled for 01/03/23 @ 1000 @ [address] Pt voiced understanding.

## 2022-12-09 NOTE — TELEPHONE ENCOUNTER
She needs to speak to someone about returning to work. She hasn't worked since August. Her next appt is Jan 17, but her excuse is only good through Jan 6.

## 2022-12-23 ENCOUNTER — CLINICAL DOCUMENTATION (OUTPATIENT)
Dept: ORTHOPEDIC SURGERY | Age: 47
End: 2022-12-23

## 2022-12-23 DIAGNOSIS — M79.2 NEURALGIA OF RIGHT FOOT: ICD-10-CM

## 2022-12-23 DIAGNOSIS — S99.921D INJURY OF RIGHT FOOT, SUBSEQUENT ENCOUNTER: Primary | ICD-10-CM

## 2022-12-23 DIAGNOSIS — S99.911D INJURY OF RIGHT ANKLE, SUBSEQUENT ENCOUNTER: ICD-10-CM

## 2022-12-23 NOTE — PROGRESS NOTES
Faxed new PT order to [facility] at fax #[phone] at their fax request and with MET's approval. Received a complete.

## 2022-12-29 ENCOUNTER — TELEPHONE (OUTPATIENT)
Dept: ORTHOPEDIC SURGERY | Age: 47
End: 2022-12-29

## 2022-12-29 NOTE — TELEPHONE ENCOUNTER
Called and spoke with pt. Informed pt Dr. Dulce Maria Simpson is out of the office until 01/03/23. Pt has an appointment scheduled with Dr. Dulce Maria Simpson on that day and she can speak with him at her appointment about a rx for Voltaren Gel. Pt voiced understanding.

## 2023-01-03 ENCOUNTER — OFFICE VISIT (OUTPATIENT)
Dept: ORTHOPEDIC SURGERY | Age: 48
End: 2023-01-03
Payer: COMMERCIAL

## 2023-01-03 ENCOUNTER — TELEPHONE (OUTPATIENT)
Dept: ORTHOPEDIC SURGERY | Age: 48
End: 2023-01-03

## 2023-01-03 DIAGNOSIS — M79.2 NEURALGIA OF RIGHT FOOT: ICD-10-CM

## 2023-01-03 DIAGNOSIS — S99.921D INJURY OF RIGHT FOOT, SUBSEQUENT ENCOUNTER: Primary | ICD-10-CM

## 2023-01-03 DIAGNOSIS — S99.911D INJURY OF RIGHT ANKLE, SUBSEQUENT ENCOUNTER: ICD-10-CM

## 2023-01-03 PROCEDURE — 99213 OFFICE O/P EST LOW 20 MIN: CPT | Performed by: ORTHOPAEDIC SURGERY

## 2023-01-03 NOTE — PROGRESS NOTES
Name: Indra Recio  YOB: 1975  Gender: female  MRN: [id number]    12/06/2022: She continued to have ankle to foot pain as well as knee to anterior shin pain. 01/03/2023: She returns with persistent forefoot pain. She reports [facility] irritated her ankle and foot. She cannot wear shoes due to forefoot sensitivity  Justice knee Dr.: Tried a knee brace    HPI:   08/28/2022: She reports falling at home injuring her ankle and foot  08/29/2022: AFC evaluation  09/02/2022: AF evaluation: Postop shoe and crutches  09/07/2022: She presented to assess her foot   09/07/2022: Initial visit with me for: Right ankle injury: Right foot injury  09/12/2022: She called stating Augmentin was bothering her stomach;;switched to Amoxicillin  09/16/2022: She presented with pain and sensitivity in the plantar lateral to medial foot with some pain radiating up above her ankle. She felt her pain related to going back to work  09/29/2022: She presented with concerns about whether she could potentially have a blood clot due to her pain and also requested MRI scans due to her pain. Pain management added Toradol. 11/02/2022: She presented to discuss her MRI scans and duplex ultrasound. She reported that after her ultrasound revealed Veliz's cyst, she was treated by an orthopedist in [place] with a knee injection. She reported that since that knee injection. ...anterior lateral knee pain extending distally into her anterior shin. She saw her pain management doctor - back injection did not resolve her anterior lateral knee pain. ROS/Meds/PSH/PMH/FH/SH: reviewed today    Tobacco:  reports that she has been smoking cigarettes.  She has never used smokeless tobacco.   Care everywhere: Treatment for low back pain, L5 radiculopathy: Listed diagnoses of depression, borderline personality disorder, general anxiety disorder treated with Seroquel Cymbalta and Remeron    Physical Examination:  Patient appears to be alert and oriented with acceptable appearance. No obvious distress or SOB  CV: appears to have acceptable vascular color and capillary refill  Neuro: appears to have mostly intact light touch sensation   Skin: No swelling   MS: Standing: Plantigrade: Gait in postop shoe  Right = fairly global dorsal forefoot sensitivity   Right = full ankle/foot motion; 5/5 strength       XR: Right side: Standing AP lateral mortise ankle plus AP oblique foot taken today with os trigonum; anterior and posterior enthesopathy; 2-3 tarsometatarsal arthritis; suspected first TMT instability; narrowing calcaneonavicular region; possible dorsal talar avulsion fracture; great toe intra-articular IP proximal phalanx fracture; tibial avulsion 2-5 base proximal phalanx fractures   XR Impression:  As above      Reviewed Test/Records/Documents:  07/19/2022: Pain management visit reflected low back pain; leg pain with diagnosis of L5 radiculopathy treated with lumbar selective nerve root block/transforaminal epidural steroid injection  09/02/2022: AFC x-rays right foot radiologic impression: Acute 2-5 proximal phalanx fractures: Fifth metatarsal fracture: Hallux valgus with first MTP and midfoot degenerative changes     09/29/2022: Right lower extremity duplex ultrasound radiologic impression:  1. Negative for DVT  2. Possible lipomas in subcutaneous tissues  3. Under findings section radiologist reflects:  Fluid collection in the popliteal fossa likely a Baker's cyst  Ovoid slightly hyperechoic masses within the subcutaneous tissues which are well-circumscribed. ..    10/28/2022: Right foot MRI scan without contrast: Radiologic impression:  1. Nondisplaced likely intra-articular fracture of the distal aspect of the fifth toe proximal phalanx. 2.  Additional suspected nondisplaced fracture of the distal aspect of the fourth toe proximal phalanx  3.   Cortical avulsion fractures of the medial second through fifth proximal phalanx bases at the site of the medial collateral ligament attachments  4. Marrow edema within the fifth metatarsal head, likely osseous contusion  5. Advanced degenerative changes of the second TMT joint and to a lesser degree the third TMT joint  6. Mild diffuse intramuscular edema of the intrinsic forefoot musculature    10/28/2022: Right ankle MRI scan without contrast: Radiologic impression:  1. No acute fracture. Well-corticated osseous body adjacent to the anterior process of the calcaneus, possible sequela of remote injury  2. Posterior subtalar joint chondrosis with subchondral cystic change. Degenerative changes at the second and third TMT joints  3. 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 Prominent os trigonum with associated small posterior ankle/hindfoot joint effusion and mild flexor houses longus tenosynovitis  5. Thickening of the central plantar fascial band with associated calcaneal enthesophyte, likely reflecting the plantar fasciopathy  6. Mild peroneal and posterior tibial tenosynovitis  7. Achilles insertional calcaneal views of the right with trace retrocalcaneal bursal fluid. 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 We explored treatment options. Regarding her knee, she was treated in [place] with a brace.     Regarding her foot pain, she describes constant pain in the dorsal forefoot  Radiographically her fractures are stable and healed  She has midfoot arthritis but that is not the source of her pain but instead the dorsal forefoot   She reports PT with Katie Corrales in [place] irritated her ankle and foot to the point where she had to stop    Unfortunately, I have nothing left to offer at this time except pain management  She has an appointment with Dr. Ady Farnsworth January 2023    She is under pain management regarding her back but hopefully they can do something to help her foot pain    I see no indication for ankle or foot surgery as her pain encompasses ankle to forefoot but more forefoot  She has an anterior calcaneal process nonunion, but MRI scan reveals no edema in that region  If her pain was isolated to the ACP, resection could be performed, but at this time, her pain is fairly global and mainly forefoot driven    I see no indication for custom insoles as she has a hard time with any shoe pressure due to forefoot pressure  I recommend she try to gradually wean into shoes as tolerated with no carbon fiber    She reports having Voltaren removed from her list of allergies and is using Voltaren gel  She requested a prescription of Voltaren gel as she is tolerating it with no ill effects     Medication - OTC meds prn:   Prescribed Voltaren gel 1%: To apply as directed: 0 refills  I recommend she fill prior prescribed: Topical 5% Neurontin and Xylocaine to apply to painful foot  Medications per pain management  Despite Diclofenac listed as an allergy, she reported taking it orally and using it topically with no ill effects and reports having it removed from her allergy list     Surgical discussion: No indication today. Future considerations for hindfoot and TMT fusion  Follow up: Pain management  Work status: Out of work for 1 month until she can see pain management    This note was created using Dragon voice recognition software which may result in errors of speech and spelling recognition and word/phrase syntax errors.

## 2023-01-03 NOTE — TELEPHONE ENCOUNTER
She was seen this morning. MET told her to see her pain mgmt doctors. She called them and they do not treat feet pain.  Can you find someone that she can see and make a referral.

## 2023-01-03 NOTE — LETTER
[address],[facility] [address]  Phone: [phone]  Fax: [phone]    Coleman Yang MD        January 3, 2023     Patient: Louann Viera   YOB: 1975   Date of Visit: 1/3/2023       To Whom It May Concern: It is my medical opinion that Louann Viera Work status: Out of work for 1 month until she can see pain management    If you have any questions or concerns, please don't hesitate to call.     Sincerely,        Coleman Yang MD

## 2023-01-04 NOTE — TELEPHONE ENCOUNTER
Called and spoke to pt , we do not know a specific pain management doctor in [place] for her feet and since she is already under the care of one pain management doctor we don't know if other  pain management doctor that will see her .

## 2023-01-09 ENCOUNTER — TELEPHONE (OUTPATIENT)
Dept: ORTHOPEDIC SURGERY | Age: 48
End: 2023-01-09

## 2023-01-09 NOTE — TELEPHONE ENCOUNTER
Called and spoke to pt . Pt will let us know after speaking with her insurance company who we need to send the referral to.